# Patient Record
Sex: FEMALE | Employment: OTHER | URBAN - METROPOLITAN AREA
[De-identification: names, ages, dates, MRNs, and addresses within clinical notes are randomized per-mention and may not be internally consistent; named-entity substitution may affect disease eponyms.]

---

## 2018-05-29 ENCOUNTER — FOLLOW UP (OUTPATIENT)
Dept: URBAN - METROPOLITAN AREA CLINIC 11 | Facility: CLINIC | Age: 63
End: 2018-05-29

## 2018-05-29 ASSESSMENT — TONOMETRY
OD_IOP_MMHG: 18
OS_IOP_MMHG: 15

## 2018-05-29 ASSESSMENT — VISUAL ACUITY
OD_CC: 20/25+2
OS_CC: 20/30-1

## 2018-10-09 ENCOUNTER — FOLLOW UP (OUTPATIENT)
Dept: URBAN - METROPOLITAN AREA CLINIC 11 | Facility: CLINIC | Age: 63
End: 2018-10-09

## 2018-10-09 ASSESSMENT — VISUAL ACUITY
OS_SC: 20/25+1
OD_SC: 20/20

## 2018-10-09 ASSESSMENT — TONOMETRY: OS_IOP_MMHG: 21

## 2018-12-13 ENCOUNTER — FOLLOW UP (OUTPATIENT)
Dept: URBAN - METROPOLITAN AREA CLINIC 11 | Facility: CLINIC | Age: 63
End: 2018-12-13

## 2018-12-13 ASSESSMENT — VISUAL ACUITY
OD_SC: 20/20-1
OS_SC: 20/20-1

## 2018-12-13 ASSESSMENT — TONOMETRY: OS_IOP_MMHG: 12

## 2018-12-13 NOTE — PATIENT DISCUSSION
Mrs. Chari Michael edema has resolved and I have recommended that she discontinue use of the Ilevro. I will see her again in 2 months for re-evaluation. She will call if she has any concerns. I have discussed with her a referral to Dr. Seda Coombs if she would like an evaluation for the eye lids.

## 2019-01-14 ENCOUNTER — FOLLOW UP (OUTPATIENT)
Dept: URBAN - METROPOLITAN AREA CLINIC 11 | Facility: CLINIC | Age: 64
End: 2019-01-14

## 2019-01-14 NOTE — PATIENT DISCUSSION
Mild CME.  Advised patient to continue Ilevro BID. luation.  Patient will keep her regularly scheduled appointment in one month. She will call if she notices further changes or problems.

## 2019-02-19 ENCOUNTER — FOLLOW UP (OUTPATIENT)
Dept: URBAN - METROPOLITAN AREA CLINIC 11 | Facility: CLINIC | Age: 64
End: 2019-02-19

## 2019-02-19 ASSESSMENT — VISUAL ACUITY
OD_SC: 20/20
OS_SC: 20/20-2

## 2019-02-19 ASSESSMENT — TONOMETRY: OS_IOP_MMHG: 18

## 2019-02-19 NOTE — PATIENT DISCUSSION
"Recommended that Mrs. Barakat use the Ilevro once a day until she returns.  She still has some ""graying out"" as she calls it.  Hopefully

## 2019-04-04 ENCOUNTER — FOLLOW UP (OUTPATIENT)
Dept: URBAN - METROPOLITAN AREA CLINIC 18 | Facility: CLINIC | Age: 64
End: 2019-04-04

## 2019-04-04 ASSESSMENT — VISUAL ACUITY
OS_SC: 20/20
OD_SC: 20/20-1

## 2019-04-04 ASSESSMENT — TONOMETRY: OD_IOP_MMHG: 15

## 2019-04-04 NOTE — PATIENT DISCUSSION
I have reassured  her that there are no holes or tears on careful examination. I will take another look in 2 weeks. She is to call sooner if she has any changes or concerns.

## 2019-04-18 ENCOUNTER — FOLLOW UP (OUTPATIENT)
Dept: URBAN - METROPOLITAN AREA CLINIC 18 | Facility: CLINIC | Age: 64
End: 2019-04-18

## 2019-04-18 ASSESSMENT — TONOMETRY: OD_IOP_MMHG: 15

## 2019-04-18 ASSESSMENT — VISUAL ACUITY
OS_SC: 20/20-2
OD_SC: 20/20

## 2019-04-18 NOTE — PATIENT DISCUSSION
Discussed with the patient that I would like for her to continue the Ilevro drops as needed.  She will return in June for another follow up.

## 2019-06-20 ENCOUNTER — FOLLOW UP (OUTPATIENT)
Dept: URBAN - METROPOLITAN AREA CLINIC 18 | Facility: CLINIC | Age: 64
End: 2019-06-20

## 2019-06-20 ASSESSMENT — TONOMETRY
OD_IOP_MMHG: 19
OS_IOP_MMHG: 19

## 2019-06-20 ASSESSMENT — VISUAL ACUITY
OS_SC: 20/20-1
OD_SC: 20/20

## 2019-06-20 NOTE — PATIENT DISCUSSION
Reassured Mrs. Barakat of the retinal tear and detachment warning symptoms reviewed and patient instructed to call immediately if increasing floaters, flashes, or decreasing peripheral vision.

## 2022-06-10 ENCOUNTER — NEW PATIENT (OUTPATIENT)
Dept: URBAN - METROPOLITAN AREA CLINIC 11 | Facility: CLINIC | Age: 67
End: 2022-06-10

## 2022-06-10 DIAGNOSIS — H35.372: ICD-10-CM

## 2022-06-10 DIAGNOSIS — H43.811: ICD-10-CM

## 2022-06-10 DIAGNOSIS — H35.81: ICD-10-CM

## 2022-06-10 DIAGNOSIS — H43.821: ICD-10-CM

## 2022-06-10 PROCEDURE — 92134 CPTRZ OPH DX IMG PST SGM RTA: CPT

## 2022-06-10 PROCEDURE — 92201 OPSCPY EXTND RTA DRAW UNI/BI: CPT

## 2022-06-10 PROCEDURE — 99204 OFFICE O/P NEW MOD 45 MIN: CPT

## 2022-06-10 ASSESSMENT — TONOMETRY
OD_IOP_MMHG: 22
OS_IOP_MMHG: 24

## 2022-06-10 ASSESSMENT — VISUAL ACUITY
OD_SC: 20/30
OS_SC: 20/20-1

## 2022-06-10 NOTE — PATIENT DISCUSSION
Lengthy conversation and examination was held with Mrs. Chitra Shrestha today and we discussed observing her condition as opposed to doing any surgeries due to the increase risk of a cataract forming at an accelerated rate. I will see her again in 2 months for a follow up exam with an OCT.

## 2022-08-09 ENCOUNTER — FOLLOW UP (OUTPATIENT)
Dept: URBAN - METROPOLITAN AREA CLINIC 11 | Facility: CLINIC | Age: 67
End: 2022-08-09

## 2022-08-09 DIAGNOSIS — H43.811: ICD-10-CM

## 2022-08-09 DIAGNOSIS — H35.371: ICD-10-CM

## 2022-08-09 PROCEDURE — 92134 CPTRZ OPH DX IMG PST SGM RTA: CPT

## 2022-08-09 PROCEDURE — 99214 OFFICE O/P EST MOD 30 MIN: CPT

## 2022-08-09 ASSESSMENT — TONOMETRY
OD_IOP_MMHG: 15
OS_IOP_MMHG: 14

## 2022-08-09 ASSESSMENT — VISUAL ACUITY
OD_SC: 20/40
OS_SC: 20/30

## 2022-08-09 NOTE — PATIENT DISCUSSION
I reassured her that her condition continues to be stable and she has nothing to worry about. I will see her back as needed.

## 2022-08-09 NOTE — PATIENT DISCUSSION
The membrane is visually significant.  Given the patients vision is stable, I Recommend Observation.

## 2023-06-22 ENCOUNTER — ESTABLISHED PATIENT (OUTPATIENT)
Dept: URBAN - METROPOLITAN AREA CLINIC 11 | Facility: CLINIC | Age: 68
End: 2023-06-22

## 2023-06-22 DIAGNOSIS — H25.11: ICD-10-CM

## 2023-06-22 DIAGNOSIS — H35.373: ICD-10-CM

## 2023-06-22 DIAGNOSIS — Z96.1: ICD-10-CM

## 2023-06-22 DIAGNOSIS — H43.811: ICD-10-CM

## 2023-06-22 PROCEDURE — 99214 OFFICE O/P EST MOD 30 MIN: CPT

## 2023-06-22 PROCEDURE — 92134 CPTRZ OPH DX IMG PST SGM RTA: CPT

## 2023-06-22 ASSESSMENT — TONOMETRY
OD_IOP_MMHG: 16
OS_IOP_MMHG: 11

## 2023-06-22 ASSESSMENT — VISUAL ACUITY
OD_SC: 20/25-1
OS_SC: 20/25